# Patient Record
Sex: FEMALE | URBAN - METROPOLITAN AREA
[De-identification: names, ages, dates, MRNs, and addresses within clinical notes are randomized per-mention and may not be internally consistent; named-entity substitution may affect disease eponyms.]

---

## 2019-08-27 ENCOUNTER — OFFICE VISIT (OUTPATIENT)
Dept: OBGYN CLINIC | Facility: CLINIC | Age: 54
End: 2019-08-27
Payer: COMMERCIAL

## 2019-08-27 VITALS
HEIGHT: 63.75 IN | WEIGHT: 145.81 LBS | SYSTOLIC BLOOD PRESSURE: 117 MMHG | DIASTOLIC BLOOD PRESSURE: 74 MMHG | BODY MASS INDEX: 25.2 KG/M2 | HEART RATE: 86 BPM

## 2019-08-27 DIAGNOSIS — Z30.431 IUD CHECK UP: ICD-10-CM

## 2019-08-27 DIAGNOSIS — Z01.419 WOMEN'S ANNUAL ROUTINE GYNECOLOGICAL EXAMINATION: ICD-10-CM

## 2019-08-27 DIAGNOSIS — N89.8 VAGINAL DISCHARGE: ICD-10-CM

## 2019-08-27 PROCEDURE — 99386 PREV VISIT NEW AGE 40-64: CPT | Performed by: ADVANCED PRACTICE MIDWIFE

## 2019-08-27 RX ORDER — AZELAIC ACID 0.15 G/G
GEL TOPICAL
Refills: 0 | COMMUNITY
Start: 2019-05-20

## 2019-08-27 RX ORDER — CLONAZEPAM 0.5 MG/1
TABLET ORAL
Refills: 0 | COMMUNITY
Start: 2019-04-04

## 2019-08-27 RX ORDER — FLUTICASONE PROPIONATE 50 MCG
SPRAY, SUSPENSION (ML) NASAL
Refills: 3 | COMMUNITY
Start: 2019-05-21

## 2019-08-27 RX ORDER — METRONIDAZOLE 7.5 MG/G
GEL TOPICAL
Refills: 0 | COMMUNITY
Start: 2019-05-20

## 2019-08-27 RX ORDER — AZELASTINE 1 MG/ML
SPRAY, METERED NASAL
Refills: 2 | COMMUNITY
Start: 2019-08-09

## 2019-08-27 RX ORDER — RIZATRIPTAN BENZOATE 10 MG/1
TABLET, ORALLY DISINTEGRATING ORAL
Refills: 2 | COMMUNITY
Start: 2019-08-09

## 2019-08-27 RX ORDER — TOPIRAMATE 50 MG/1
TABLET, FILM COATED ORAL
Refills: 3 | COMMUNITY
Start: 2019-06-03

## 2019-08-27 RX ORDER — VENLAFAXINE HYDROCHLORIDE 150 MG/1
CAPSULE, EXTENDED RELEASE ORAL
Refills: 0 | COMMUNITY
Start: 2019-07-30

## 2019-08-27 NOTE — PROGRESS NOTES
HPI:    Patient ID: Ashley Lofton is a 49year old female who presents for her annual gyne exam.  Pt has a Kalpana IUD for menstrual regulation. Lives alone in a stable monogamous same sex relationship. Works [employer]. Denies any ETOH or drug use.  Mother a sm discharge. There is bleeding in the vagina. No erythema or tenderness in the vagina. Vaginal discharge found. Genitourinary Comments: IUD strings visualized  Thick white vaginal discharge - pt denies any symptoms   Skin: She is not diaphoretic.

## 2019-08-30 LAB
GENITAL VAGINOSIS SCREEN: NEGATIVE
TRICHOMONAS SCREEN: NEGATIVE

## 2019-08-31 ENCOUNTER — TELEPHONE (OUTPATIENT)
Dept: OBGYN CLINIC | Facility: CLINIC | Age: 54
End: 2019-08-31

## 2019-08-31 NOTE — TELEPHONE ENCOUNTER
----- Message from Jhonathan Murillo CNM sent at 8/31/2019  8:55 AM CDT -----  Vaginal screen normal no signs of infection